# Patient Record
Sex: FEMALE | Race: BLACK OR AFRICAN AMERICAN | NOT HISPANIC OR LATINO | URBAN - NONMETROPOLITAN AREA
[De-identification: names, ages, dates, MRNs, and addresses within clinical notes are randomized per-mention and may not be internally consistent; named-entity substitution may affect disease eponyms.]

---

## 2019-06-21 ENCOUNTER — IMPORTED ENCOUNTER (OUTPATIENT)
Dept: URBAN - NONMETROPOLITAN AREA CLINIC 1 | Facility: CLINIC | Age: 79
End: 2019-06-21

## 2019-06-21 PROBLEM — H25.13: Noted: 2019-06-21

## 2019-06-21 PROBLEM — H52.4: Noted: 2019-06-21

## 2019-06-21 PROBLEM — E11.9: Noted: 2019-06-21

## 2019-06-21 PROCEDURE — S0620 ROUTINE OPHTHALMOLOGICAL EXA: HCPCS

## 2019-06-21 NOTE — PATIENT DISCUSSION
Myopia / Karla Feliz  / Starr Meyer - Discussed diagnosis in detail with patient - New glasses Rx given today- Continue to monitor- RTC 1 year complete IDDM / NIDDM (not sure how long)- Discussed diagnosis in detail with patient- Stressed importance of good blood sugar control- Recommend no soda’s- No diabetic retinopathy seen on todays exam- Letter [facility] Dr. Sadia Hardy- [facility] Discussed diagnosis in detail with patient- Discussed signs and symptoms of progression- Discussed UV protection- BAT done today OD 20/50 and OS 20/50- Recommend cataract eval with Dr. Best Cunningham patient defers treatment at this time- Continue to monitor

## 2021-06-21 NOTE — PROCEDURE NOTE: CLINICAL
PROCEDURE NOTE: Avastin ()(1 of 3) #1 OD. Diagnosis: Branch Retinal Vein Occlusion with Macular Edema. Anesthesia: Proparacaine 0.5%. Prep: Betadine Drops and Betadine Scrub. Prior to the original injection, risks/benefits/alternatives discussed including infection, loss of vision, hemorrhage, cataract, glaucoma, retinal tears or detachment. A written consent is on file, and the need for today’s injection was discussed and the patient is understanding and wishes to proceed. The off-label status of Intravitreal Avastin also was reviewed. The patient wished to proceed with treatment. The patient wished to proceed with treatment. Topical anesthetic drops were applied to the eye. Betadine prep was performed. Surgical mask worn. Sterile drape and lid speculum were applied. Using the syringe provided, Avastin 1.25 mg in 0.05 cc was injected into the vitreous cavity. Injection site: 3-4 mm from the limbus. Patient tolerated procedure well. Following the intravitreal injection, the sterile lid speculum was removed. CRA perfusion confirmed. CF vision checked. Patient given office phone number/answering service number and advised to call immediately should there be an increase in floaters or redness, loss of vision or pain, or should they have any other questions or concerns. Marisol Ferrara

## 2021-06-21 NOTE — PATIENT DISCUSSION
add Latanoprost QHS OU, IOP is recommended to be lower that it is today with recent BRVO and h/o glaucoma.

## 2021-06-21 NOTE — PATIENT DISCUSSION
CPM's and add Latanoprost QHS OU.  IOP recommended to be lower due to recent BRVO OD and h/o glaucoma.

## 2021-06-21 NOTE — PATIENT DISCUSSION
I explained to the patient that they have swelling in the center of their macula: macular edema from a BRVO.  This can develop sometimes in patients that have glaucoma.  Systemic factors are also a consideration and rec keep atherosclerotic risk factor control, pt has h/o leukemia s/p bone marrow trans. and was discussed in detail.

## 2021-06-21 NOTE — PATIENT DISCUSSION
Avastin #1 recommended today after discussion of benefits, risks and alternatives. The injection was given and tolerated well by patient. Post-injection instructions were reviewed and understood by the patient.  Series of 3 x 4 weeks apart.

## 2021-06-21 NOTE — PATIENT DISCUSSION
Clinical exam and/or retinal imaging show an increase in macular thickness consistent with worsening and/or developing macular edema from BRVO.

## 2021-07-09 ENCOUNTER — IMPORTED ENCOUNTER (OUTPATIENT)
Dept: URBAN - NONMETROPOLITAN AREA CLINIC 1 | Facility: CLINIC | Age: 81
End: 2021-07-09

## 2021-07-09 PROBLEM — H25.813: Noted: 2021-07-09

## 2021-07-09 PROBLEM — E11.9: Noted: 2019-06-21

## 2021-07-09 PROBLEM — H52.4: Noted: 2019-06-21

## 2021-07-09 PROCEDURE — S0621 ROUTINE OPHTHALMOLOGICAL EXA: HCPCS

## 2021-07-09 NOTE — PATIENT DISCUSSION
Myopia / Caryn Son  / Ita Roche - Discussed diagnosis in detail with patient - New glasses Rx given today- Continue to monitorIDDM / NIDDM (not sure how long)- Discussed diagnosis in detail with patient- Stressed importance of good blood sugar control- Recommend no soda’s- No diabetic retinopathy seen on todays exam- Letter to Dr. Minerva Taylor- Continue to monitorCataracts OU- Discussed diagnosis in detail with patient- Discussed signs and symptoms of progression- Discussed UV protection- BAT done previously OD 20/50 and OS 20/50- Recommend cataract eval with Dr. Darshana Gonzalez patient defers treatment at this time- Continue to monitor

## 2021-07-19 NOTE — PROCEDURE NOTE: CLINICAL
PROCEDURE NOTE: Avastin () (2 of 3) #2 OD. Diagnosis: Branch Retinal Vein Occlusion with Macular Edema. Anesthesia: Proparacaine 0.5%. Prep: Betadine Drops and Betadine Scrub. Prior to the original injection, risks/benefits/alternatives discussed including infection, loss of vision, hemorrhage, cataract, glaucoma, retinal tears or detachment. A written consent is on file, and the need for today’s injection was discussed and the patient is understanding and wishes to proceed. The off-label status of Intravitreal Avastin also was reviewed. The patient wished to proceed with treatment. The patient wished to proceed with treatment. Topical anesthetic drops were applied to the eye. Betadine prep was performed. Surgical mask worn. Sterile drape and lid speculum were applied. Using the syringe provided, Avastin 1.25 mg in 0.05 cc was injected into the vitreous cavity. Injection site: 3-4 mm from the limbus. Patient tolerated procedure well. Following the intravitreal injection, the sterile lid speculum was removed. CRA perfusion confirmed. CF vision checked. Patient given office phone number/answering service number and advised to call immediately should there be an increase in floaters or redness, loss of vision or pain, or should they have any other questions or concerns. Criss Wilson

## 2021-07-19 NOTE — PATIENT DISCUSSION
Avastin #2 (2 of 3) recommended today after discussion of benefits, risks and alternatives. The injection was given and tolerated well by patient. Post-injection instructions were reviewed and understood by the patient.

## 2021-08-16 NOTE — PATIENT DISCUSSION
Avastin #3 (3 of 3) recommended today after discussion of benefits, risks and alternatives. The injection was given and tolerated well by patient. Post-injection instructions were reviewed and understood by the patient.

## 2021-08-16 NOTE — PROCEDURE NOTE: CLINICAL
PROCEDURE NOTE: Avastin () (3 of 3) #3 OD. Diagnosis: Branch Retinal Vein Occlusion with Macular Edema. Anesthesia: Proparacaine 0.5%. Prep: Betadine Drops and Betadine Scrub. Prior to the original injection, risks/benefits/alternatives discussed including infection, loss of vision, hemorrhage, cataract, glaucoma, retinal tears or detachment. A written consent is on file, and the need for today’s injection was discussed and the patient is understanding and wishes to proceed. The off-label status of Intravitreal Avastin also was reviewed. The patient wished to proceed with treatment. The patient wished to proceed with treatment. Topical anesthetic drops were applied to the eye. Betadine prep was performed. Surgical mask worn. Sterile drape and lid speculum were applied. Using the syringe provided, Avastin 1.25 mg in 0.05 cc was injected into the vitreous cavity. Injection site: 3-4 mm from the limbus. Patient tolerated procedure well. Following the intravitreal injection, the sterile lid speculum was removed. CRA perfusion confirmed. CF vision checked. Patient given office phone number/answering service number and advised to call immediately should there be an increase in floaters or redness, loss of vision or pain, or should they have any other questions or concerns. Sabiha Seay

## 2021-09-15 ENCOUNTER — IMPORTED ENCOUNTER (OUTPATIENT)
Dept: URBAN - NONMETROPOLITAN AREA CLINIC 1 | Facility: CLINIC | Age: 81
End: 2021-09-15

## 2021-09-15 PROBLEM — E11.9: Noted: 2021-09-15

## 2021-09-15 PROBLEM — H52.4: Noted: 2021-09-15

## 2021-09-15 PROBLEM — H25.813: Noted: 2021-09-15

## 2021-09-15 PROCEDURE — 92014 COMPRE OPH EXAM EST PT 1/>: CPT

## 2021-09-15 NOTE — PATIENT DISCUSSION
Cataract(s)-Visually significant cataract OU.-Cataract(s) causing symptomatic impairment of visual function not correctable with a tolerable change in glasses or contact lenses lighting or non-operative means resulting in specific activity limitations and/or participation restrictions including but not limited to reading viewing television driving or meeting vocational or recreational needs. -Expectation is clearer vision and functional improvement in symptoms as well as reduced glare disability after cataract removal.-Order IOLMaster and OPD today. -Recommend Stand/Trad  based on today's OPD testing and lifestyle questionnaire.-All questions were answered regarding surgery including pre and post-op medications appointments activity restrictions and anesthetic usage.-The risks benefits and alternatives and special risk factors for the patient were discussed in detail including but not limited to: bleeding infection retinal detachment vitreous loss problems with the implant and possible need for additional surgery.-Although rare the possibility of complete vision loss was discussed.-The possible need for glasses post-operatively was discussed.-Order medical clearance exam based on history of diabetes -Patient elects to proceed with cataract surgery OS . Will schedule at patient's convenience and re-evaluate OD  in the future. Discussed all lens options w/ pt. Pt elects Stand/Trad OU. Explained the need for bifocals s/p surgery.  Post op inflammation anticpated discussed Richard Tang

## 2021-09-20 NOTE — PATIENT DISCUSSION
persistent ME, rec cont w/ avastin series with possible switch to Ozurdex or Eylea/lucentis.   Avastin #4 (1 of 3) recommended today after discussion of benefits, risks and alternatives. The injection was given and tolerated well by patient. Post-injection instructions were reviewed and understood by the patient.  series of 3 x 4 weeks apart.

## 2021-09-20 NOTE — PROCEDURE NOTE: CLINICAL
PROCEDURE NOTE: Avastin ()(1 of 3) #4 OD. Diagnosis: Branch Retinal Vein Occlusion with Macular Edema. Anesthesia: Proparacaine 0.5%. Prep: Betadine Drops and Betadine Scrub. Prior to the original injection, risks/benefits/alternatives discussed including infection, loss of vision, hemorrhage, cataract, glaucoma, retinal tears or detachment. A written consent is on file, and the need for today’s injection was discussed and the patient is understanding and wishes to proceed. The off-label status of Intravitreal Avastin also was reviewed. The patient wished to proceed with treatment. The patient wished to proceed with treatment. Topical anesthetic drops were applied to the eye. Betadine prep was performed. Surgical mask worn. Sterile drape and lid speculum were applied. Using the syringe provided, Avastin 1.25 mg in 0.05 cc was injected into the vitreous cavity. Injection site: 3-4 mm from the limbus. Patient tolerated procedure well. Following the intravitreal injection, the sterile lid speculum was removed. CRA perfusion confirmed. CF vision checked. Patient given office phone number/answering service number and advised to call immediately should there be an increase in floaters or redness, loss of vision or pain, or should they have any other questions or concerns. Fransisco Chou

## 2021-10-06 ENCOUNTER — IMPORTED ENCOUNTER (OUTPATIENT)
Dept: URBAN - NONMETROPOLITAN AREA CLINIC 1 | Facility: CLINIC | Age: 81
End: 2021-10-06

## 2021-10-06 PROBLEM — E11.9: Noted: 2021-10-06

## 2021-10-06 PROBLEM — K21.9: Noted: 2021-10-06

## 2021-10-06 PROBLEM — I10: Noted: 2021-10-06

## 2021-10-06 PROBLEM — Z01.818: Noted: 2021-10-06

## 2021-10-06 PROBLEM — E78.5: Noted: 2021-10-06

## 2021-10-14 ENCOUNTER — IMPORTED ENCOUNTER (OUTPATIENT)
Dept: URBAN - NONMETROPOLITAN AREA CLINIC 1 | Facility: CLINIC | Age: 81
End: 2021-10-14

## 2021-10-14 PROBLEM — Z98.42: Noted: 2021-10-14

## 2021-10-14 PROCEDURE — 99024 POSTOP FOLLOW-UP VISIT: CPT

## 2021-10-14 NOTE — PATIENT DISCUSSION
1-Day POV CE OS 10/13/21 Standard IOL + Dextenza- Discussed diagnosis in detail with patient. - Patient doing well and stable. - Wound intact- Continue post op drops as directed. - Continue to monitor. RTC in 1 week for POV/Reval with Dr. Sy Centeno

## 2021-10-18 NOTE — PROCEDURE NOTE: CLINICAL
PROCEDURE NOTE: Avastin ()(2 of 3) #5 OD. Diagnosis: Branch Retinal Vein Occlusion with Macular Edema. Anesthesia: Proparacaine 0.5%. Prep: Betadine Drops and Betadine Scrub. Prior to the original injection, risks/benefits/alternatives discussed including infection, loss of vision, hemorrhage, cataract, glaucoma, retinal tears or detachment. A written consent is on file, and the need for today’s injection was discussed and the patient is understanding and wishes to proceed. The off-label status of Intravitreal Avastin also was reviewed. The patient wished to proceed with treatment. The patient wished to proceed with treatment. Topical anesthetic drops were applied to the eye. Betadine prep was performed. Surgical mask worn. Sterile drape and lid speculum were applied. Using the syringe provided, Avastin 1.25 mg in 0.05 cc was injected into the vitreous cavity. Injection site: 3-4 mm from the limbus. Patient tolerated procedure well. Following the intravitreal injection, the sterile lid speculum was removed. CRA perfusion confirmed. CF vision checked. Patient given office phone number/answering service number and advised to call immediately should there be an increase in floaters or redness, loss of vision or pain, or should they have any other questions or concerns. Momo Duran

## 2021-10-18 NOTE — PATIENT DISCUSSION
persistent ME, rec cont w/ avastin series with possible switch to Ozurdex or Eylea/lucentis.   Avastin #5 (2 of 3) recommended today after discussion of benefits, risks and alternatives. The injection was given and tolerated well by patient. Post-injection instructions were reviewed and understood by the patient.  series of 3 x 4 weeks apart.

## 2021-10-20 ENCOUNTER — IMPORTED ENCOUNTER (OUTPATIENT)
Dept: URBAN - NONMETROPOLITAN AREA CLINIC 1 | Facility: CLINIC | Age: 81
End: 2021-10-20

## 2021-10-20 PROBLEM — E11.9: Noted: 2021-10-20

## 2021-10-20 PROBLEM — H25.811: Noted: 2021-10-20

## 2021-10-20 PROBLEM — I10: Noted: 2021-10-20

## 2021-10-20 PROBLEM — Z98.42: Noted: 2021-10-20

## 2021-10-20 PROBLEM — Z01.818: Noted: 2021-10-20

## 2021-10-20 PROBLEM — E78.5: Noted: 2021-10-20

## 2021-10-20 PROCEDURE — 99024 POSTOP FOLLOW-UP VISIT: CPT

## 2021-10-20 NOTE — PATIENT DISCUSSION
1-Day POV CE OS 10/13/21 Standard IOL + Dextenza- Discussed diagnosis in detail with patient. - Patient doing well and stable. - Wound intact- Continue post op drops as directed. - Continue to monitor. RTC in 1 week for POV/Reval with Dr. Best Cunningham

## 2021-10-20 NOTE — PATIENT DISCUSSION
Cataract(s)-Visually significant cataract OD . -Cataract(s) causing symptomatic impairment of visual function not correctable with a tolerable change in glasses or contact lenses lighting or non-operative means resulting in specific activity limitations and/or participation restrictions including but not limited to reading viewing television driving or meeting vocational or recreational needs. -Expectation is clearer vision and functional improvement in symptoms as well as reduced glare disability after cataract removal.-Recommend Stand/Trad  based on previous OPD testing and lifestyle questionnaire.-All questions were answered regarding surgery including pre and post-op medications appointments activity restrictions and anesthetic usage.-The risks benefits and alternatives and special risk factors for the patient were discussed in detail including but not limited to: bleeding infection retinal detachment vitreous loss problems with the implant and possible need for additional surgery.-Although rare the possibility of complete vision loss was discussed.-The need for glasses post-operatively was discussed.-Patient elects to proceed with cataract surgery OD . Will schedule at patient's convenience. s/p PCIOL CE OS Stand/Trad + Dextenza -Pt doing well at 1 week s/p PCIOL. -Continue post-op gtts according to instruction sheet.-Okay to resume usual activites and d/c eye shield.

## 2021-11-11 ENCOUNTER — IMPORTED ENCOUNTER (OUTPATIENT)
Dept: URBAN - NONMETROPOLITAN AREA CLINIC 1 | Facility: CLINIC | Age: 81
End: 2021-11-11

## 2021-11-11 PROBLEM — Z98.42: Noted: 2021-10-20

## 2021-11-11 PROBLEM — Z98.41: Noted: 2021-11-11

## 2021-11-11 PROCEDURE — 99024 POSTOP FOLLOW-UP VISIT: CPT

## 2021-11-11 NOTE — PATIENT DISCUSSION
1 Day POV CE OD 11/10/2021 CE OS 10/13/2021 Citlali w/ Dextenza- Discussed diagnosis in detail with patient- Patient is stable and doing well- Wound intact- Continue all post op drops as directed.  Handwritten RX's given today for Ketorolac and Cipro- Instilled a drop of Alphagan in OD today pressure 27- Continue to monitor- RTC 1 week POV

## 2021-11-15 NOTE — PROCEDURE NOTE: CLINICAL
PROCEDURE NOTE: Avastin ()(3 of 3) #6 OD. Diagnosis: Branch Retinal Vein Occlusion with Macular Edema. Anesthesia: Proparacaine 0.5%. Prep: Betadine Drops and Betadine Scrub. Prior to the original injection, risks/benefits/alternatives discussed including infection, loss of vision, hemorrhage, cataract, glaucoma, retinal tears or detachment. A written consent is on file, and the need for today’s injection was discussed and the patient is understanding and wishes to proceed. The off-label status of Intravitreal Avastin also was reviewed. The patient wished to proceed with treatment. The patient wished to proceed with treatment. Topical anesthetic drops were applied to the eye. Betadine prep was performed. Surgical mask worn. Sterile drape and lid speculum were applied. Using the syringe provided, Avastin 1.25 mg in 0.05 cc was injected into the vitreous cavity. Injection site: 3-4 mm from the limbus. Patient tolerated procedure well. Following the intravitreal injection, the sterile lid speculum was removed. CRA perfusion confirmed. CF vision checked. Patient given office phone number/answering service number and advised to call immediately should there be an increase in floaters or redness, loss of vision or pain, or should they have any other questions or concerns. Chucho Oneill

## 2021-11-15 NOTE — PATIENT DISCUSSION
previously discussed due to persistent ME, rec cont w/ avastin series with possible switch to Ozurdex or Eylea/lucentis in future.  Avastin #6  (3 of 3) recommended today after discussion of benefits, risks and alternatives. The injection was given and tolerated well by patient. Post-injection instructions were reviewed and understood by the patient.  patient to return in 4 weeks.

## 2021-11-17 ENCOUNTER — IMPORTED ENCOUNTER (OUTPATIENT)
Dept: URBAN - NONMETROPOLITAN AREA CLINIC 1 | Facility: CLINIC | Age: 81
End: 2021-11-17

## 2021-11-17 PROBLEM — Z98.42: Noted: 2021-10-20

## 2021-11-17 PROBLEM — H26.493: Noted: 2021-11-17

## 2021-11-17 PROBLEM — Z98.41: Noted: 2021-11-11

## 2021-11-17 PROCEDURE — 99024 POSTOP FOLLOW-UP VISIT: CPT

## 2021-11-17 NOTE — PATIENT DISCUSSION
1 week POV CE OD 11/10/2021 CE OS 10/13/2021 Citlali w/ Anali Petersen- Discussed diagnosis in detail with patient- Wound intact- Continue all post op drops as directed- PCO OU noted but no treatment needed at this time - Continue to monitor- RTC 3 weeks POV MR

## 2021-12-10 ENCOUNTER — IMPORTED ENCOUNTER (OUTPATIENT)
Dept: URBAN - NONMETROPOLITAN AREA CLINIC 1 | Facility: CLINIC | Age: 81
End: 2021-12-10

## 2021-12-10 PROCEDURE — 99024 POSTOP FOLLOW-UP VISIT: CPT

## 2021-12-10 PROCEDURE — 92015 DETERMINE REFRACTIVE STATE: CPT

## 2021-12-13 NOTE — PATIENT DISCUSSION
Improving ME, rec cont w/ avastin series with possible switch to Ozurdex or Eylea/lucentis in future.  Avastin #7  (1 of 3) recommended today after discussion of benefits, risks and alternatives. The injection was given and tolerated well by patient. Post-injection instructions were reviewed and understood by the patient.  Series of 3 x 4 to 5 weeks apart.

## 2021-12-13 NOTE — PROCEDURE NOTE: CLINICAL
PROCEDURE NOTE: Avastin ()(1 of 3) #7 OD. Diagnosis: Branch Retinal Vein Occlusion with Macular Edema. Anesthesia: Proparacaine 0.5%. Prep: Betadine Drops and Betadine Scrub. Prior to the original injection, risks/benefits/alternatives discussed including infection, loss of vision, hemorrhage, cataract, glaucoma, retinal tears or detachment. A written consent is on file, and the need for today’s injection was discussed and the patient is understanding and wishes to proceed. The off-label status of Intravitreal Avastin also was reviewed. The patient wished to proceed with treatment. The patient wished to proceed with treatment. Topical anesthetic drops were applied to the eye. Betadine prep was performed. Surgical mask worn. Sterile drape and lid speculum were applied. Using the syringe provided, Avastin 1.25 mg in 0.05 cc was injected into the vitreous cavity. Injection site: 3-4 mm from the limbus. Patient tolerated procedure well. Following the intravitreal injection, the sterile lid speculum was removed. CRA perfusion confirmed. CF vision checked. Patient given office phone number/answering service number and advised to call immediately should there be an increase in floaters or redness, loss of vision or pain, or should they have any other questions or concerns. Radha Thorpe

## 2022-01-17 NOTE — PATIENT DISCUSSION
Previous visit showed Improving ME, rec cont w/ avastin series with possible switch to Ozurdex or Eylea/Lucentis in future.

## 2022-01-17 NOTE — PROCEDURE NOTE: CLINICAL
PROCEDURE NOTE: Avastin ()(2 of 3) #8 OD. Diagnosis: Branch Retinal Vein Occlusion with Macular Edema. Anesthesia: Proparacaine 0.5%. Prep: Betadine Drops and Betadine Scrub. Prior to the original injection, risks/benefits/alternatives discussed including infection, loss of vision, hemorrhage, cataract, glaucoma, retinal tears or detachment. A written consent is on file, and the need for today’s injection was discussed and the patient is understanding and wishes to proceed. The off-label status of Intravitreal Avastin also was reviewed. The patient wished to proceed with treatment. The patient wished to proceed with treatment. Topical anesthetic drops were applied to the eye. Betadine prep was performed. Surgical mask worn. Sterile drape and lid speculum were applied. Using the syringe provided, Avastin 1.25 mg in 0.05 cc was injected into the vitreous cavity. Injection site: 3-4 mm from the limbus. Patient tolerated procedure well. Following the intravitreal injection, the sterile lid speculum was removed. CRA perfusion confirmed. CF vision checked. Patient given office phone number/answering service number and advised to call immediately should there be an increase in floaters or redness, loss of vision or pain, or should they have any other questions or concerns. Sabiha Seay

## 2022-01-17 NOTE — PATIENT DISCUSSION
Patient here for Avastin #8 (2 of 3) injection today, after discussion of benefits, risks and alternatives. The injection was given and tolerated well by patient. Post-injection instructions were reviewed and understood by the patient.  Series of 3 x 4 to 5 weeks apart. Patient to return for Avastin (3 of 3).

## 2022-02-28 NOTE — PATIENT DISCUSSION
Previously discussed cont w/ avastin series with possible switch to Ozurdex or Eylea/Lucentis in future.

## 2022-02-28 NOTE — PROCEDURE NOTE: CLINICAL
PROCEDURE NOTE: Avastin ()(3 of 3) #9 OD. Diagnosis: Branch Retinal Vein Occlusion with Macular Edema. Anesthesia: 2% Lidocaine. Prep: Betadine Drops and Betadine Scrub. Prior to the original injection, risks/benefits/alternatives discussed including infection, loss of vision, hemorrhage, cataract, glaucoma, retinal tears or detachment. A written consent is on file, and the need for today’s injection was discussed and the patient is understanding and wishes to proceed. The off-label status of Intravitreal Avastin also was reviewed. The patient wished to proceed with treatment. The patient wished to proceed with treatment. Topical anesthetic drops were applied to the eye. Betadine prep was performed. Surgical mask worn. Sterile drape and lid speculum were applied. Using the syringe provided, Avastin 1.25 mg in 0.05 cc was injected into the vitreous cavity. Injection site: 3-4 mm from the limbus. Patient tolerated procedure well. Following the intravitreal injection, the sterile lid speculum was removed. CRA perfusion confirmed. CF vision checked. Patient given office phone number/answering service number and advised to call immediately should there be an increase in floaters or redness, loss of vision or pain, or should they have any other questions or concerns. Chucho Oneill

## 2022-02-28 NOTE — PATIENT DISCUSSION
Patient here for Avastin #9 (3 of 3) injection today, after discussion of benefits, risks and alternatives. The injection was given and tolerated well by patient. Post-injection instructions were reviewed and understood by the patient.  ends series of 3 x 4 to 5 weeks apart. Patient to return for OCT.

## 2022-03-11 ENCOUNTER — FOLLOW UP (OUTPATIENT)
Dept: URBAN - NONMETROPOLITAN AREA CLINIC 1 | Facility: CLINIC | Age: 82
End: 2022-03-11

## 2022-03-11 DIAGNOSIS — H26.493: ICD-10-CM

## 2022-03-11 PROCEDURE — 99213 OFFICE O/P EST LOW 20 MIN: CPT

## 2022-03-11 ASSESSMENT — TONOMETRY
OS_IOP_MMHG: 12
OD_IOP_MMHG: 12

## 2022-03-11 ASSESSMENT — VISUAL ACUITY
OD_CC: 20/25-2
OS_CC: 20/25-2

## 2022-04-09 ASSESSMENT — TONOMETRY
OS_IOP_MMHG: 15
OD_IOP_MMHG: 27
OD_IOP_MMHG: 14
OD_IOP_MMHG: 17
OS_IOP_MMHG: 15
OS_IOP_MMHG: 16
OD_IOP_MMHG: 14
OS_IOP_MMHG: 16
OS_IOP_MMHG: 18
OD_IOP_MMHG: 19
OS_IOP_MMHG: 16
OD_IOP_MMHG: 16
OS_IOP_MMHG: 18
OS_IOP_MMHG: 17
OD_IOP_MMHG: 17
OD_IOP_MMHG: 16

## 2022-04-09 ASSESSMENT — VISUAL ACUITY
OS_GLARE: 20/50
OD_CC: 20/20-
OD_PH: 20/25
OD_PAM: 20/20-2
OS_CC: 20/20-
OD_SC: 20/63
OD_PH: 20/25
OD_PAM: 20/20-2
OS_CC: 20/25+2
OS_CC: 20/40-
OD_CC: 20/30
OS_AM: 20/25
OS_SC: 20/40-
OD_SC: 20/30
OD_GLARE: 20/50
OD_CC: 20/20
OD_CC: 20/30+
OS_PH: 20/32
OS_CC: 20/25+2
OD_SC: 20/30-
OD_CC: 20/30-1
OS_SC: 20/50-
OD_CC: 20/40
OS_CC: 20/20-
OD_PAM: 20/20-2
OS_CC: 20/70-1
OS_CC: 20/30+
OS_SC: 20/30-2
OD_PH: 20/25

## 2022-04-11 NOTE — PATIENT DISCUSSION
Recommend Avastin #10 (1 of 3) injection today, after discussion of benefits, risks and alternatives. The injection was given and tolerated well by patient. Post-injection instructions were reviewed and understood by the patient.

## 2022-04-11 NOTE — PROCEDURE NOTE: CLINICAL
PROCEDURE NOTE: Avastin ()(1 of 3) #10 OD. Diagnosis: Branch Retinal Vein Occlusion with Macular Edema. Anesthesia: 2% Lidocaine. Prep: Betadine Drops and Betadine Scrub. Prior to the original injection, risks/benefits/alternatives discussed including infection, loss of vision, hemorrhage, cataract, glaucoma, retinal tears or detachment. A written consent is on file, and the need for today’s injection was discussed and the patient is understanding and wishes to proceed. The off-label status of Intravitreal Avastin also was reviewed. The patient wished to proceed with treatment. The patient wished to proceed with treatment. Topical anesthetic drops were applied to the eye. Betadine prep was performed. Surgical mask worn. Sterile drape and lid speculum were applied. Using the syringe provided, Avastin 1.25 mg in 0.05 cc was injected into the vitreous cavity. Injection site: 3-4 mm from the limbus. Patient tolerated procedure well. Following the intravitreal injection, the sterile lid speculum was removed. CRA perfusion confirmed. CF vision checked. Patient given office phone number/answering service number and advised to call immediately should there be an increase in floaters or redness, loss of vision or pain, or should they have any other questions or concerns. Ryan Mendes

## 2022-05-16 NOTE — PROCEDURE NOTE: CLINICAL
PROCEDURE NOTE: Avastin () (2 of 3) #11 OD. Diagnosis: Branch Retinal Vein Occlusion with Macular Edema. Anesthesia: 2% Lidocaine. Prep: Betadine Drops and Betadine Scrub. Prior to the original injection, risks/benefits/alternatives discussed including infection, loss of vision, hemorrhage, cataract, glaucoma, retinal tears or detachment. A written consent is on file, and the need for today’s injection was discussed and the patient is understanding and wishes to proceed. The off-label status of Intravitreal Avastin also was reviewed. The patient wished to proceed with treatment. The patient wished to proceed with treatment. Topical anesthetic drops were applied to the eye. Betadine prep was performed. Surgical mask worn. Sterile drape and lid speculum were applied. Using the syringe provided, Avastin 1.25 mg in 0.05 cc was injected into the vitreous cavity. Injection site: 3-4 mm from the limbus. Patient tolerated procedure well. Following the intravitreal injection, the sterile lid speculum was removed. CRA perfusion confirmed. CF vision checked. Patient given office phone number/answering service number and advised to call immediately should there be an increase in floaters or redness, loss of vision or pain, or should they have any other questions or concerns. Fabiano Luna

## 2022-05-16 NOTE — PATIENT DISCUSSION
Recommend Avastin #11(2 of 3) injection today, after discussion of benefits, risks and alternatives. The injection was given and tolerated well by patient. Post-injection instructions were reviewed and understood by the patient.

## 2022-06-20 NOTE — PATIENT DISCUSSION
Recommend Avastin #12 (3 of 3) injection today, after discussion of benefits, risks and alternatives. The injection was given and tolerated well by patient. Post-injection instructions were reviewed and understood by the patient.

## 2022-06-20 NOTE — PROCEDURE NOTE: CLINICAL
PROCEDURE NOTE: Avastin () (3 of 3) #12 OD. Diagnosis: Branch Retinal Vein Occlusion with Macular Edema. Anesthesia: 2% Lidocaine. Prep: Betadine Drops and Betadine Scrub. Prior to the original injection, risks/benefits/alternatives discussed including infection, loss of vision, hemorrhage, cataract, glaucoma, retinal tears or detachment. A written consent is on file, and the need for today’s injection was discussed and the patient is understanding and wishes to proceed. The off-label status of Intravitreal Avastin also was reviewed. The patient wished to proceed with treatment. The patient wished to proceed with treatment. Topical anesthetic drops were applied to the eye. Betadine prep was performed. Surgical mask worn. Sterile drape and lid speculum were applied. Using the syringe provided, Avastin 1.25 mg in 0.05 cc was injected into the vitreous cavity. Injection site: 3-4 mm from the limbus. Patient tolerated procedure well. Following the intravitreal injection, the sterile lid speculum was removed. CRA perfusion confirmed. CF vision checked. Patient given office phone number/answering service number and advised to call immediately should there be an increase in floaters or redness, loss of vision or pain, or should they have any other questions or concerns. Mark Hyatt

## 2022-08-15 NOTE — PROCEDURE NOTE: CLINICAL
PROCEDURE NOTE: Eylea Sample OD. Diagnosis: Branch Retinal Vein Occlusion with Macular Edema. Anesthesia: Akten Gel 3.5%. Prep: Betadine Drops. Prior to injection, risks/benefits/alternatives discussed including infection, loss of vision, hemorrhage, cataract, glaucoma, retinal tears or detachment. The patient wished to proceed with treatment. Betadine prep was performed. Topical anesthesia was induced with Alcaine. Additional anesthesia was achieved using drop(s) or injection checked above. A drop of Povidone-iodine 5% ophthalmic solution was instilled over the injection site and in the inferior fornix. Using the syringe provided, Eylea 2.0mg in 0.05 cc was injected into the vitreous cavity. The remainder of the Eylea in the single-use vial was then discarded in a medical waste disposal container. The needle was passed 3.0 mm posterior to the limbus in pseudophakic patients, and 3.5 mm posterior to the limbus in phakic patients. The eye was irrigated with sterile irrigating solution. Patient tolerated procedure well. There were no complications. Post procedure instructions given. Injection time: 231PM. The patient was instructed to return for re-evaluation in approximately 4-12 weeks depending on his/her condition and was told to call immediately if vision decreases and/or if his/her eye becomes red, painful, and/or light sensitive. The patient was instructed to go to the emergency room or call 911 if unable to reach the doctor within an hour or two of trying or calling. The patient was instructed to use Artificial Tears q.i.d. p.r.n for comfort. Criss Wilson

## 2022-08-15 NOTE — PATIENT DISCUSSION
8/15/22: UNCLEAR ETIOLOGY. WE'LL TRY TO DRY OUT EDEMA IN OD, IF NO IMPROVEMENT AFTER RESOLUTION OF FLUID, CONSIDER ENT VS NEURO F/U.

## 2022-08-15 NOTE — PATIENT DISCUSSION
8/15/22: GIVEN PERSISTENT EDEMA AND SRF, RECOMMEND SWITCHING MEDICATION. Based on today's exam, diagnostic studies, and review of records, the determination was made for treatment today. EYLEA 2mg SAMPLE recommended today after discussion of benefits, risks and alternatives. The injection was given without complication and tolerated well by the patient. Post-injection instructions were reviewed and understood by the patient. Procedure was performed without complications. Instructed to call immediately if any new distortion, blurring, decreased vision or eye pain.

## 2022-09-21 NOTE — PROCEDURE NOTE: CLINICAL
PROCEDURE NOTE: Eylea PFS OD. Diagnosis: Branch Retinal Vein Occlusion with Macular Edema. Anesthesia: Lidocaine 4%. Prep: Betadine Drops. Prior to injection, risks/benefits/alternatives discussed including but not limited to infection, loss of vision or eye, hemorrhage, cataract, glaucoma, retinal tears or detachment. The patient wished to proceed with treatment. Betadine prep was performed. Topical anesthesia was induced with Alcaine. Additional anesthesia was achieved using drop(s) or injection checked above. A drop of Povidone-iodine 5% ophthalmic solution was instilled over the injection site and in the inferior fornix. A single use prefilled syringe of intravitreal Eylea 2mg/0.05ml was used and excess was disposed of as waste. The needle was passed 3.0 mm posterior to the limbus in pseudophakic patients, and 3.5 mm posterior to the limbus in phakic patients. Injection time: 5:00 PM.  Patient tolerated procedure well. There were no complications. The eye was irrigated with sterile irrigating solution. Post procedure instructions given. The patient was instructed to use Artificial Tears q.i.d. p.r.n for comfort. The patient was instructed to return for re-evaluation in approximately 4-12 weeks depending on his/her condition and was told to call immediately if vision decreases and/or if his/her eye becomes red, painful, and/or light sensitive. The patient was instructed to go to the emergency room or call 911 if unable to reach the doctor within an hour or two of trying or calling. Leticia Gandhi

## 2022-11-01 NOTE — PROCEDURE NOTE: CLINICAL
PROCEDURE NOTE: Eylea 2mg OD. Diagnosis: Branch Retinal Vein Occlusion with Macular Edema. Prior to injection, risks/benefits/alternatives discussed including corneal abrasion, infection, loss of vision, hemorrhage, cataract, glaucoma, retinal tears or detachment. A written consent is on file, and the need for today’s injection was discussed and the patient is understanding and wishes to proceed. The entire vial of Eylea was drawn up into a syringe. The opened vial, remaining drug, and filtered needle were disposed of in a certified biohazard container. Betadine prep was performed. Topical anesthesia was induced with Alcaine. 4% lidocaine pledge. A lid speculum was used. A short 30g needle on a 1cc syringe was used with product that that had previously been prepared under sterile conditions. Injection site: 3-4 mm from the limbus. The used syringe/needle was transferred to a biohazard container. Lid speculum removed. Mask worn during procedure. Patient tolerated procedure well. Count fingers vision was verified. There were no complications. Patient was given the standard instruction sheet. Patient given office phone number/answering service number and advised to call immediately should there be loss of vision or pain, or should they have any other questions or concerns. Myrtle Salazar

## 2022-11-01 NOTE — PATIENT DISCUSSION
Change losartan/hydrochlorothiazide back to losartan 100 mg daily with refills for a year and add amlodipine 5 mg daily with refills for a year. Update me with some readings a couple of weeks after the change   For injection of Eylea today.

## 2022-12-06 NOTE — PROCEDURE NOTE: CLINICAL
PROCEDURE NOTE: Eylea Prefilled Syringe 2mg OD. Diagnosis: Branch Retinal Vein Occlusion with Macular Edema. Prep: Betadine Drops and Betadine Scrub. Prior to injection, risks/benefits/alternatives discussed including corneal abrasion, infection, loss of vision, hemorrhage, cataract, glaucoma, retinal tears or detachment. A written consent is on file, and the need for today's injection was discussed and the patient is understanding and wishes to proceed. A 30G needle was placed on an Eylea 2mg/0.05ml Pre-filled Syringe. Betadine prep was performed. Topical anesthesia was induced with Alcaine. 4% lidocaine pledge. A lid speculum was used. An intravitreal injection of Eylea was given. Injection site: 3-4 mm from the limbus. The used syringe/needle was transferred to a biohazard container. Lid speculum removed. Mask worn during procedure. Patient tolerated procedure well. Count fingers vision was verified. There were no complications. Patient was given the standard instruction sheet. Juan Luis Adamson

## 2022-12-12 ENCOUNTER — FOLLOW UP (OUTPATIENT)
Dept: URBAN - NONMETROPOLITAN AREA CLINIC 1 | Facility: CLINIC | Age: 82
End: 2022-12-12

## 2022-12-12 PROCEDURE — 92015 DETERMINE REFRACTIVE STATE: CPT

## 2022-12-12 PROCEDURE — 99213 OFFICE O/P EST LOW 20 MIN: CPT

## 2022-12-12 ASSESSMENT — VISUAL ACUITY
OD_CC: 20/30-1
OS_CC: 20/30-2
OS_SC: 20/20-1
OD_SC: 20/20-1

## 2022-12-12 ASSESSMENT — TONOMETRY
OS_IOP_MMHG: 14
OD_IOP_MMHG: 14

## 2023-01-10 NOTE — PATIENT DISCUSSION
Consider PRP laser for persistent retinal edema despite short interval of intravitreal injections. negative...

## 2023-01-10 NOTE — PROCEDURE NOTE: CLINICAL
PROCEDURE NOTE: Eylea Prefilled Syringe 2mg OD. Diagnosis: Branch Retinal Vein Occlusion with Macular Edema. Prep: Betadine Drops and Betadine Scrub. The patient was identified by the physician. The risks, benefits, alternatives, and possible complications of the procedure were discussed with the patient and informed consent was obtained. The procedure eye was marked with a sticker. The patient was positioned in the chair. A sterile small gauge needle on the medication syringe entered the vitreous cavity 3.0-3.5mm from the limbus and the medication was administered without complication. The speculum was removed. The eye was irrigated with sterile eye rinse solution. The patient was instructed to call immediately for any visual loss, swelling, discharge, or pain after the intravitreal injection. Patient tolerated the procedure well. Vision was checked. Post procedure instructions given. Eder Marie

## 2023-12-13 ENCOUNTER — FOLLOW UP (OUTPATIENT)
Dept: URBAN - NONMETROPOLITAN AREA CLINIC 1 | Facility: CLINIC | Age: 83
End: 2023-12-13

## 2023-12-13 DIAGNOSIS — H52.4: ICD-10-CM

## 2023-12-13 PROCEDURE — S0621AEC ROUTINE OPH EXAM INCLUDES REF/ EST PATIENT

## 2023-12-13 ASSESSMENT — TONOMETRY
OD_IOP_MMHG: 16
OS_IOP_MMHG: 16

## 2023-12-13 ASSESSMENT — VISUAL ACUITY
OS_SC: 20/25
OD_SC: 20/30

## 2024-12-18 ENCOUNTER — FOLLOW UP (OUTPATIENT)
Age: 84
End: 2024-12-18

## 2024-12-18 DIAGNOSIS — H26.493: ICD-10-CM

## 2024-12-18 PROCEDURE — 99213 OFFICE O/P EST LOW 20 MIN: CPT
